# Patient Record
Sex: FEMALE | Race: WHITE | NOT HISPANIC OR LATINO | URBAN - METROPOLITAN AREA
[De-identification: names, ages, dates, MRNs, and addresses within clinical notes are randomized per-mention and may not be internally consistent; named-entity substitution may affect disease eponyms.]

---

## 2023-07-06 ENCOUNTER — OFFICE (OUTPATIENT)
Dept: URBAN - METROPOLITAN AREA CLINIC 9 | Facility: CLINIC | Age: 68
End: 2023-07-06

## 2023-07-06 VITALS
HEART RATE: 98 BPM | SYSTOLIC BLOOD PRESSURE: 135 MMHG | HEIGHT: 66 IN | WEIGHT: 193 LBS | OXYGEN SATURATION: 98 % | DIASTOLIC BLOOD PRESSURE: 73 MMHG

## 2023-07-06 DIAGNOSIS — R15.9 FULL INCONTINENCE OF FECES: ICD-10-CM

## 2023-07-06 DIAGNOSIS — R15.0 INCOMPLETE DEFECATION: ICD-10-CM

## 2023-07-06 DIAGNOSIS — K58.2 MIXED IRRITABLE BOWEL SYNDROME: ICD-10-CM

## 2023-07-06 PROCEDURE — 99203 OFFICE O/P NEW LOW 30 MIN: CPT | Performed by: NURSE PRACTITIONER

## 2023-07-06 NOTE — SERVICENOTES
May benefit from pelvic floor therapy if the above does not work and recent colonoscopy negative as per her recall.

## 2023-07-06 NOTE — SERVICEHPINOTES
Ms. Alejo is a pleasant 68-year-old  female with a PMH significant for esophagitis / GERD, diverticulosis/ diverticulitis, COPD/emphysema, bipolar type 2, anxiety, osteoarthritis, hypercholesterolemia, essential hypertension.  Patient presents to establish care as she recently moved from [facility] 20 months ago.  Prior to this she had colonoscopy by Dr. Lowry in [facility] all 18 months ago and was told everything was okay.  she presents today with alternating bowel habits with periods of fecal incontinence as well as urinary incontinence.  She has been experiencing urinary incontinence for 20+ years.  She did have for vaginal burst with a PCI to me years ago.  Fecal incontinence x3 episodes in the past 2 months.  Small amount each time with either soft/ solid stools or loose stools.  She has 6. Her 2. On the [place] stool chart and has a bowel movement every 4-5 days.  No blood or mucus.  No primary family history of colon cancer, stomach cancer, IBD.  Does have a family history of celiac disease in her daughter. 
dave
br    will obtain records from previous gastroenterologist as well as colonoscopy 18 months ago.   He does admit to abdominal cramping and bloating with incomplete evacuation at times.  She states she has trouble initiating a bowel movement.  No perianal discomfort.  No nausea, vomiting.  No heartburn, reflux.  No fever, chills, sweats.   He states PCP has referred her to urology for her urinary incontinence to rule out any obstructive uropathy.

## 2023-07-08 LAB
IMMUNOGLOBULIN A, QN, SERUM: 104 MG/DL (ref 87–352)
T-TRANSGLUTAMINASE (TTG) IGA: <2 U/ML